# Patient Record
(demographics unavailable — no encounter records)

---

## 2024-10-28 NOTE — PHYSICAL EXAM
[2+] : left foot dorsalis pedis 2+ [No Focal Deficits] : no focal deficits [Ankle Swelling (On Exam)] : not present [Varicose Veins Of Lower Extremities] : not present [FreeTextEntry3] : CFT: 3 seconds x 10.  Temperature gradient: within normal limits.  [de-identified] : Bilateral bunion deformity present with tracking 1st MPJ ROM.  No joint effusions or erythema.  No pain with palpation of the 1st metatarsal head or with ROM.  Mild pain with compression of right plantar medial hallux.   [FreeTextEntry1] : Light touch intact.

## 2024-10-28 NOTE — HISTORY OF PRESENT ILLNESS
[FreeTextEntry1] : Patient presents today with a complaint of pain in the right foot that started months ago after she had worn flip-flops/slides for prolonged periods of time.  She has gone to a nail salon to have the skin shaved; however, that has not been beneficial.  Patient denies any acute trauma.  Denies any redness or swelling.  Patient states that she has no pain to the bilateral 1st MPJ's.

## 2024-10-28 NOTE — PHYSICAL EXAM
[2+] : left foot dorsalis pedis 2+ [No Focal Deficits] : no focal deficits [Ankle Swelling (On Exam)] : not present [Varicose Veins Of Lower Extremities] : not present [FreeTextEntry3] : CFT: 3 seconds x 10.  Temperature gradient: within normal limits.  [de-identified] : Bilateral bunion deformity present with tracking 1st MPJ ROM.  No joint effusions or erythema.  No pain with palpation of the 1st metatarsal head or with ROM.  Mild pain with compression of right plantar medial hallux.   [FreeTextEntry1] : Light touch intact.

## 2024-10-28 NOTE — ASSESSMENT
[FreeTextEntry1] : Impression: Pain in right hallux (M79.674).  Callus (L84).  Bunion deformity.    Treatment: Discussed etiology and treatment options.  Due to patient's bunion deformity, there is increased pressure when patient walks in unsupportive shoe gear to the plantar medial aspect of the hallux resulting in hyperkeratotic skin buildup.  Advised patient to wear supportive, well-padded shoe gear with an arch support at all times and decrease wearing slides/flip-flops. The callus was wiped with alcohol and shaved with a sterile #15 blade revealing no ulceration underneath and gave patient relief in pain.   Prescribed Ammonium Lactate to use over the hyperkeratotic skin lesion to soften it and moisturize it daily as well as recommended to use over-the-counter Urea 40% cream to soften the skin lesion and exfoliate gently in the shower with a pumice stone. Return: As needed.

## 2024-10-28 NOTE — PHYSICAL EXAM
[2+] : left foot dorsalis pedis 2+ [No Focal Deficits] : no focal deficits [Ankle Swelling (On Exam)] : not present [Varicose Veins Of Lower Extremities] : not present [FreeTextEntry3] : CFT: 3 seconds x 10.  Temperature gradient: within normal limits.  [de-identified] : Bilateral bunion deformity present with tracking 1st MPJ ROM.  No joint effusions or erythema.  No pain with palpation of the 1st metatarsal head or with ROM.  Mild pain with compression of right plantar medial hallux.   [FreeTextEntry1] : Light touch intact.